# Patient Record
Sex: FEMALE | URBAN - METROPOLITAN AREA
[De-identification: names, ages, dates, MRNs, and addresses within clinical notes are randomized per-mention and may not be internally consistent; named-entity substitution may affect disease eponyms.]

---

## 2017-10-31 ENCOUNTER — MYC MEDICAL ADVICE (OUTPATIENT)
Dept: FAMILY MEDICINE | Facility: CLINIC | Age: 27
End: 2017-10-31

## 2017-10-31 DIAGNOSIS — J45.20 MILD INTERMITTENT ASTHMA WITHOUT COMPLICATION: ICD-10-CM

## 2017-11-01 RX ORDER — MONTELUKAST SODIUM 10 MG/1
10 TABLET ORAL AT BEDTIME
Qty: 60 TABLET | Refills: 0 | Status: SHIPPED | OUTPATIENT
Start: 2017-11-01

## 2017-11-01 NOTE — TELEPHONE ENCOUNTER
One time refill with 60 tabs ordered.  She will need annual follow up for asthma, hopefully will be home during the upcoming holidays for an appointment.     Celi Toscano MD

## 2017-11-01 NOTE — TELEPHONE ENCOUNTER
Singulair    Last Written Prescription Date: 7/24/2015  Last Fill Quantity: 90,  # refills: 2   Last Office Visit with [facility], [facility] or [facility] prescribing provider: 11/22/2016                                               Routing refill request to provider for review/approval because:  A break in medication

## 2017-12-02 DIAGNOSIS — J45.20 MILD INTERMITTENT ASTHMA WITHOUT COMPLICATION: ICD-10-CM

## 2017-12-02 NOTE — LETTER
[address] [place] [address]   [phone]      December 27, 2017    Noemy Dennis  [id number] [facility] [id number]            Dear Ms. Dennis    Your physician requires an office visit every 12 months in order to monitor your maintenance medication(s).  Your last office visit was on 11/22/16.   Please call the clinic at [phone] to schedule an appointment..        Sincerely,    [facility]

## 2017-12-04 NOTE — TELEPHONE ENCOUNTER
Should have refills to get her through this month - refilled 60 tabs on 11/1.  Will she be home during [place] for an appointment?    Celi Toscano MD

## 2017-12-04 NOTE — TELEPHONE ENCOUNTER
Requested Prescriptions   Pending Prescriptions Disp Refills     montelukast (SINGULAIR) 10 MG tablet [Pharmacy Med Name: MONTELUKAST 10MG TABLET] 60 tablet      Sig: TAKE 1 TABLET BY MOUTH AT  BEDTIME    Leukotriene Inhibitors Protocol Failed    12/2/2017  3:34 AM       Failed - Asthma control test 20 or greater in past 6 months    Please review ACT score.          Failed - Recent (6 mo) or future visit with authorizing provider's specialty    Patient had office visit in the last 6 months or has a visit in the next 30 days with authorizing provider.  See chart review.            Passed - Patient is age 12 or older    If patient is under 16, ok to refill using age based dosing.           Routing refill request to provider for review/approval because:  Noemi given x1 and patient did not follow up, please advise    Mahsa Winkler RN   [facility] - Triage

## 2017-12-04 NOTE — TELEPHONE ENCOUNTER
Routing to team to inform and assist in scheduling.   Mahsa Winkler RN   [facility] - Triage

## 2017-12-27 RX ORDER — MONTELUKAST SODIUM 10 MG/1
TABLET ORAL
Qty: 60 TABLET | OUTPATIENT
Start: 2017-12-27

## 2017-12-27 NOTE — TELEPHONE ENCOUNTER
Left additional message for pt she has not read her Archiverâ€™s message.    Will mail letter.    Triage please close chart.    Martha Quezada CMA

## 2019-09-30 ENCOUNTER — HEALTH MAINTENANCE LETTER (OUTPATIENT)
Age: 29
End: 2019-09-30

## 2020-03-22 ENCOUNTER — HEALTH MAINTENANCE LETTER (OUTPATIENT)
Age: 30
End: 2020-03-22

## 2021-01-15 ENCOUNTER — HEALTH MAINTENANCE LETTER (OUTPATIENT)
Age: 31
End: 2021-01-15

## 2021-10-24 ENCOUNTER — HEALTH MAINTENANCE LETTER (OUTPATIENT)
Age: 31
End: 2021-10-24

## 2022-02-13 ENCOUNTER — HEALTH MAINTENANCE LETTER (OUTPATIENT)
Age: 32
End: 2022-02-13

## 2022-10-16 ENCOUNTER — HEALTH MAINTENANCE LETTER (OUTPATIENT)
Age: 32
End: 2022-10-16

## 2023-03-26 ENCOUNTER — HEALTH MAINTENANCE LETTER (OUTPATIENT)
Age: 33
End: 2023-03-26